# Patient Record
Sex: FEMALE | Race: WHITE | URBAN - METROPOLITAN AREA
[De-identification: names, ages, dates, MRNs, and addresses within clinical notes are randomized per-mention and may not be internally consistent; named-entity substitution may affect disease eponyms.]

---

## 2023-07-14 ENCOUNTER — OFFICE (OUTPATIENT)
Dept: URBAN - METROPOLITAN AREA CLINIC 11 | Facility: CLINIC | Age: 38
End: 2023-07-14

## 2023-07-14 VITALS
HEART RATE: 63 BPM | WEIGHT: 155 LBS | OXYGEN SATURATION: 100 % | DIASTOLIC BLOOD PRESSURE: 68 MMHG | HEIGHT: 65 IN | SYSTOLIC BLOOD PRESSURE: 128 MMHG

## 2023-07-14 DIAGNOSIS — R19.7 DIARRHEA, UNSPECIFIED: ICD-10-CM

## 2023-07-14 DIAGNOSIS — K90.9 INTESTINAL MALABSORPTION, UNSPECIFIED: ICD-10-CM

## 2023-07-14 DIAGNOSIS — R11.0 NAUSEA: ICD-10-CM

## 2023-07-14 DIAGNOSIS — R63.4 ABNORMAL WEIGHT LOSS: ICD-10-CM

## 2023-07-14 PROCEDURE — 99204 OFFICE O/P NEW MOD 45 MIN: CPT | Performed by: NURSE PRACTITIONER

## 2023-07-14 NOTE — SERVICENOTES
She had a negative celiac and food allergy panel.  Will check stool studies and get a CT scan given weight loss.  Given her symptoms, she likely has some pancreatic insufficiency which is concerning given her young age and no  significant health history. will see back in 2 months

## 2023-07-14 NOTE — SERVICEHPINOTES
Ms. Sutton is a 37 year old female referred by her PCP for diarrhea, weight loss, nausea. She had a negative Celiac and food allergen panel on 6/21/23. She had a normal TSH and sed rate as well.  She had COVID in January 2022 and  started getting hives on her arms, neck, chest after eating.  She did not have any difficulty breathing and it resolved after a few months. She had formed bowel movements during this time as well. She got COVID again in August 2022 and   She noticed her bowel movements started getting softer in October.  At the end of February of this year, she started having oily diarrhea 4 to 5 times a day.  She typically has diarrhea 20-30 minutes after eating.  She has lost almost 40 pounds in the past 5 months and denies any change in eating habits or oral intake.  She has had occasional nausea the past few weeks after eating certain things.  She denies any vomiting, heartburn, reflux, abdominal pain, blood in stool.

## 2023-07-15 LAB
CREATININE: 1.07 MG/DL — HIGH (ref 0.57–1)
CREATININE: EGFR: 68 ML/MIN/1.73 (ref 59–?)

## 2023-07-24 ENCOUNTER — OFFICE (OUTPATIENT)
Dept: URBAN - METROPOLITAN AREA CLINIC 22 | Facility: CLINIC | Age: 38
End: 2023-07-24

## 2023-07-24 DIAGNOSIS — R63.4 ABNORMAL WEIGHT LOSS: ICD-10-CM

## 2023-07-24 DIAGNOSIS — K57.90 DIVERTICULOSIS OF INTESTINE, PART UNSPECIFIED, WITHOUT PERFO: ICD-10-CM

## 2023-07-24 DIAGNOSIS — R19.7 DIARRHEA, UNSPECIFIED: ICD-10-CM

## 2023-07-24 PROCEDURE — 74177 CT ABD & PELVIS W/CONTRAST: CPT | Mod: TC | Performed by: INTERNAL MEDICINE

## 2023-08-01 LAB
